# Patient Record
Sex: MALE | Race: WHITE | NOT HISPANIC OR LATINO | Employment: FULL TIME | URBAN - METROPOLITAN AREA
[De-identification: names, ages, dates, MRNs, and addresses within clinical notes are randomized per-mention and may not be internally consistent; named-entity substitution may affect disease eponyms.]

---

## 2017-05-10 ENCOUNTER — TELEPHONE (OUTPATIENT)
Dept: TRANSPLANT | Facility: CLINIC | Age: 62
End: 2017-05-10

## 2017-05-10 NOTE — TELEPHONE ENCOUNTER
----- Message from Vonda Valero sent at 5/9/2017  3:29 PM CDT -----  Contact: Angel Luis  F/abran on pt referral, please call  [phone]

## 2017-05-11 ENCOUNTER — TELEPHONE (OUTPATIENT)
Dept: TRANSPLANT | Facility: CLINIC | Age: 62
End: 2017-05-11

## 2017-05-11 ENCOUNTER — DOCUMENTATION ONLY (OUTPATIENT)
Dept: TRANSPLANT | Facility: CLINIC | Age: 62
End: 2017-05-11

## 2017-05-11 RX ORDER — RAMIPRIL 5 MG/1
CAPSULE ORAL
COMMUNITY
Start: 2017-01-13

## 2017-05-11 NOTE — TELEPHONE ENCOUNTER
Spoke to pt, pending decision if repeat imaging is needed prior to appt her at [facility]. Pt agreed. Informed we are trying to prevent multiple appts on diff.days due to him living in [facility]. HE will obtain CD with noel from [facility]. He will be called with appts within the week pt agreed.

## 2017-05-16 ENCOUNTER — TELEPHONE (OUTPATIENT)
Dept: TRANSPLANT | Facility: CLINIC | Age: 62
End: 2017-05-16

## 2017-05-16 DIAGNOSIS — R93.2 ABNORMAL LIVER DIAGNOSTIC IMAGING: Primary | ICD-10-CM

## 2017-05-16 NOTE — TELEPHONE ENCOUNTER
----- Message from Chelsy Morejon sent at 5/16/2017  9:37 AM CDT -----  Contact: pt  Wants to speak with Arabella to see if the appts they discussed will be scheduled please call him @  # [phone].

## 2017-05-17 DIAGNOSIS — R93.2 ABNORMAL LIVER DIAGNOSTIC IMAGING: Primary | ICD-10-CM

## 2017-05-17 DIAGNOSIS — R77.2 ELEVATED AFP: Primary | ICD-10-CM

## 2017-05-17 DIAGNOSIS — R77.2 ELEVATED AFP: ICD-10-CM

## 2017-05-23 ENCOUNTER — TELEPHONE (OUTPATIENT)
Dept: RADIOLOGY | Facility: HOSPITAL | Age: 62
End: 2017-05-23

## 2017-05-24 ENCOUNTER — HOSPITAL ENCOUNTER (OUTPATIENT)
Dept: RADIOLOGY | Facility: HOSPITAL | Age: 62
Discharge: HOME | End: 2017-05-24
Payer: COMMERCIAL

## 2017-05-24 ENCOUNTER — PROCEDURE VISIT (OUTPATIENT)
Dept: HEPATOLOGY | Facility: CLINIC | Age: 62
End: 2017-05-24
Attending: INTERNAL MEDICINE
Payer: COMMERCIAL

## 2017-05-24 ENCOUNTER — OFFICE VISIT (OUTPATIENT)
Dept: HEPATOLOGY | Facility: CLINIC | Age: 62
End: 2017-05-24
Payer: COMMERCIAL

## 2017-05-24 ENCOUNTER — TELEPHONE (OUTPATIENT)
Dept: HEPATOLOGY | Facility: CLINIC | Age: 62
End: 2017-05-24

## 2017-05-24 VITALS
HEART RATE: 78 BPM | WEIGHT: 163.81 LBS | TEMPERATURE: 98 F | BODY MASS INDEX: 26.33 KG/M2 | DIASTOLIC BLOOD PRESSURE: 99 MMHG | RESPIRATION RATE: 16 BRPM | SYSTOLIC BLOOD PRESSURE: 158 MMHG | OXYGEN SATURATION: 98 % | HEIGHT: 66 IN

## 2017-05-24 DIAGNOSIS — F10.11 ALCOHOL USE DISORDER, MILD, IN EARLY REMISSION: ICD-10-CM

## 2017-05-24 DIAGNOSIS — R93.2 ABNORMAL LIVER DIAGNOSTIC IMAGING: ICD-10-CM

## 2017-05-24 DIAGNOSIS — R77.2 HIGH ALPHA FETOPROTEIN (AFP) TUMOR MARKER: ICD-10-CM

## 2017-05-24 DIAGNOSIS — B18.2 CHRONIC HEPATITIS C WITHOUT HEPATIC COMA: ICD-10-CM

## 2017-05-24 DIAGNOSIS — R77.2 HIGH ALPHA FETOPROTEIN (AFP) TUMOR MARKER: Primary | ICD-10-CM

## 2017-05-24 DIAGNOSIS — R77.2 ELEVATED AFP: ICD-10-CM

## 2017-05-24 PROBLEM — K74.60 CHRONIC HEPATITIS C WITH CIRRHOSIS: Status: ACTIVE | Noted: 2017-05-24

## 2017-05-24 PROCEDURE — 91200 LIVER ELASTOGRAPHY: CPT | Mod: S$GLB,,, | Performed by: INTERNAL MEDICINE

## 2017-05-24 PROCEDURE — 74177 CT ABD & PELVIS W/CONTRAST: CPT | Mod: TC

## 2017-05-24 PROCEDURE — 99999 PR PBB SHADOW E&M-EST. PATIENT-LVL IV: CPT | Mod: PBBFAC,,, | Performed by: INTERNAL MEDICINE

## 2017-05-24 PROCEDURE — 1160F RVW MEDS BY RX/DR IN RCRD: CPT | Mod: S$GLB,,, | Performed by: INTERNAL MEDICINE

## 2017-05-24 PROCEDURE — 99205 OFFICE O/P NEW HI 60 MIN: CPT | Mod: S$GLB,,, | Performed by: INTERNAL MEDICINE

## 2017-05-24 PROCEDURE — 25500020 PHARM REV CODE 255: Performed by: INTERNAL MEDICINE

## 2017-05-24 PROCEDURE — 74177 CT ABD & PELVIS W/CONTRAST: CPT | Mod: 26,,, | Performed by: RADIOLOGY

## 2017-05-24 RX ORDER — VELPATASVIR AND SOFOSBUVIR 100; 400 MG/1; MG/1
400 TABLET, FILM COATED ORAL DAILY
COMMUNITY
Start: 2017-05-22

## 2017-05-24 RX ORDER — ASPIRIN 81 MG/1
81 TABLET ORAL DAILY
COMMUNITY

## 2017-05-24 RX ORDER — KETOROLAC TROMETHAMINE 10 MG/1
10 TABLET, FILM COATED ORAL
COMMUNITY
Start: 2017-01-05 | End: 2017-05-24

## 2017-05-24 RX ORDER — FLUTICASONE PROPIONATE 50 MCG
SPRAY, SUSPENSION (ML) NASAL
COMMUNITY
Start: 2016-04-22

## 2017-05-24 RX ORDER — AMLODIPINE BESYLATE 5 MG/1
5 TABLET ORAL
COMMUNITY
Start: 2017-01-13 | End: 2017-05-24

## 2017-05-24 RX ORDER — TIZANIDINE 4 MG/1
4 TABLET ORAL
COMMUNITY
Start: 2017-01-05 | End: 2017-05-24

## 2017-05-24 RX ADMIN — IOHEXOL 15 ML: 350 INJECTION, SOLUTION INTRAVENOUS at 11:05

## 2017-05-24 RX ADMIN — IOHEXOL 75 ML: 350 INJECTION, SOLUTION INTRAVENOUS at 03:05

## 2017-05-24 RX ADMIN — IOHEXOL 15 ML: 350 INJECTION, SOLUTION INTRAVENOUS at 12:05

## 2017-05-24 NOTE — PROCEDURES
Procedures   Fibroscan Procedure     Name: Hernan Durand  Date of Procedure : 2017   :: Rik Garrett MD  Diagnosis: HCV  Probe: M    Fibroscan readin.6 KPa    IQR/med:20 %    Fibrosis:F4

## 2017-05-24 NOTE — TELEPHONE ENCOUNTER
Patient: Hernan Durand       MRN: [id number]      : 1955     Age: 62 y.o.  [address]  [place] [address]    Provider: Hepatologist - Gerry    Patient Transplant Status: Other new referral    Reason for presentation: Other High AFP    Clinical Summary: HCV cirrhosis. Recently found to have an AFP of 448- down to 240 with HCV treatment.  Scans reported NOT to show a mass  Patient is well    Imaging to be reviewed: CT/MRI- outside + CT 17 (here)    HCC Treatment History: none    ABO:     Platelets:   Lab Results   Component Value Date/Time     (L) 2017 07:21 AM     Creatinine:   Lab Results   Component Value Date/Time    CREATININE 1.2 2017 07:21 AM     Bilirubin:   Lab Results   Component Value Date/Time    BILITOT 1.0 2017 07:21 AM     AFP Last 3 each if available:   Lab Results   Component Value Date/Time     (H) 2017 07:21 AM       MELD: MELD-Na score: 9 at 2017  7:21 AM  MELD score: 9 at 2017  7:21 AM  Calculated from:  Serum Creatinine: 1.2 mg/dL at 2017  7:21 AM  Serum Sodium: 138 mmol/L (Rounded to 137) at 2017  7:21 AM  Total Bilirubin: 1.0 mg/dL at 2017  7:21 AM  INR(ratio): 1.1 at 2017  7:21 AM  Age: 62 years    Plan:     Follow-up Provider:

## 2017-05-24 NOTE — LETTER
May 24, 2017      Aleshia Kwan  [facility] [address] [facility] [place] [address]           [facility]  [address]  [facility] [address]  Phone: [phone]  Fax: [phone]          Patient: Hernan Durand   MR Number: [id number]   YOB: 1955   Date of Visit: 5/24/2017       Dear Aleshia Kwan:    Thank you for referring Hernan Durand to me for evaluation. Attached you will find relevant portions of my assessment and plan of care.    If you have questions, please do not hesitate to call me. I look forward to following Hernan Durand along with you.    Sincerely,    Rik Garrett MD    Enclosure  CC:  No Recipients    If you would like to receive this communication electronically, please contact [email or web address] or [phone] to request more information on [employer] access.    For providers and/or their staff who would like to refer a patient to [facility], please contact us through our one-stop-shop provider referral line, [facility] , at [phone].    If you feel you have received this communication in error or would no longer like to receive these types of communications, please e-mail [email or web address]

## 2017-05-24 NOTE — PROGRESS NOTES
Subjective:       Patient ID: Hernan Durand is a 62 y.o. male.    Chief Complaint: Abnormal Abdominal/Liver Imaging and Elevated AFP    HPI  I saw this 62 y.o. man who came to the hepatology clinic with his wife.  He has known about his HCV G1a infection for a number of years and was previously a partial responder to Peg/Riba (2008).    Over the last year he has had some mild right flank pain but has otherwise been well with well compensated liver disease.    His fibroscan today shows cirrhosis and he is on treatment with epclusa (3 weeks).  - HCV G1a viral load >10 million    He was found to have a high AFP but imaging by US/CT and MRI have not shown a mass.     (previously 448)- Testicular ca screen was normal.    Heavy alcohol until Oct 2016-18-24 beers per week      PMH:  Hypertension  HCV infection    SH:  Works as a [employer]- [employer]    2 kids- boys- healthy    FH:  nil    Review of Systems   Constitutional: Negative for activity change, appetite change, chills, fatigue, fever and unexpected weight change.   HENT: Negative for hearing loss.    Eyes: Negative for discharge and visual disturbance.   Respiratory: Negative for cough, chest tightness, shortness of breath and wheezing.    Cardiovascular: Negative for chest pain, palpitations and leg swelling.   Gastrointestinal: Negative for abdominal distention, abdominal pain, constipation, diarrhea and nausea.   Genitourinary: Negative for dysuria and frequency.   Musculoskeletal: Negative for arthralgias and back pain.   Skin: Negative for pallor and rash.   Neurological: Negative for dizziness, tremors, speech difficulty and headaches.   Hematological: Negative for adenopathy.   Psychiatric/Behavioral: Negative for agitation and confusion.           Lab Results   Component Value Date    ALT 27 05/24/2017    AST 26 05/24/2017    GGT 67 (H) 05/24/2017    ALKPHOS 101 05/24/2017    BILITOT 1.0 05/24/2017     No past medical history on  file.  No past surgical history on file.  Current Outpatient Prescriptions   Medication Sig    aspirin (ECOTRIN) 81 MG EC tablet Take 81 mg by mouth once daily.    EPCLUSA 400-100 mg Tab Take 400 mg by mouth once daily at 6am.     fluticasone (FLONASE) 50 mcg/actuation nasal spray 2 SPRAYS BY NASAL ROUTE DAILY.    multivitamin with minerals tablet Take 1 tablet by mouth once daily.    ramipril (ALTACE) 5 MG capsule TAKE 1 CAPSULES BID FOR BLOOD PRESSURE     No current facility-administered medications for this visit.        Objective:      Physical Exam   Constitutional: He is oriented to person, place, and time. He appears well-nourished.   HENT:   Head: Normocephalic.   Eyes: Pupils are equal, round, and reactive to light.   Neck: No thyromegaly present.   Cardiovascular: Normal rate, regular rhythm and normal heart sounds.    Pulmonary/Chest: Effort normal and breath sounds normal. He has no wheezes.   Abdominal: Soft. He exhibits no distension and no mass. There is no tenderness.   Lymphadenopathy:     He has no cervical adenopathy.   Neurological: He is alert and oriented to person, place, and time.   Skin: Skin is warm. No rash noted. No erythema.   Psychiatric: He has a normal mood and affect. His behavior is normal.       Assessment:       1. Chronic hepatitis C without hepatic coma    2. Alcohol use disorder, mild, in early remission    3. High alpha fetoprotein (AFP) tumor marker        Plan:   I explained to him today that he has cirrhosis and have urged him not to have alcohol again.    His AFP has decreased since the initiation of HCV treatment and it is possible that it was a reflection of severe hepatitis rather than HCC.  We will continue to follow this over the next few weeks.    - local EGD- he will arrange locally  - needs to check if he was immunized against HAV and HBV  - no raw oysters    tCT abdo/pelvis today and IR review of all previous scans including today's.    I will let him know  what these results show and if we do not find any masses, I will see him again in July 2017.

## 2017-05-24 NOTE — PATIENT INSTRUCTIONS
1. Your fibroscan shows cirrhosis  2. Please arrange an EGD (endoscopy) throgh your local doctor  3. Don't eat raw oysters  4. Check with your doctor whether you need immunizations for Hep A or B  5. Do not drink alcohol  5. I will see you again in mid July 2017

## 2017-05-29 ENCOUNTER — CONFERENCE (OUTPATIENT)
Dept: TRANSPLANT | Facility: CLINIC | Age: 62
End: 2017-05-29

## 2017-05-30 ENCOUNTER — TELEPHONE (OUTPATIENT)
Dept: TRANSPLANT | Facility: CLINIC | Age: 62
End: 2017-05-30

## 2017-05-30 DIAGNOSIS — K74.69 COMPENSATED HCV CIRRHOSIS: Primary | ICD-10-CM

## 2017-05-30 DIAGNOSIS — B19.20 COMPENSATED HCV CIRRHOSIS: Primary | ICD-10-CM

## 2017-05-30 NOTE — TELEPHONE ENCOUNTER
Patient: Hernan Durand       MRN: [id number]      : 1955     Age: 62 y.o.  [address]  [place] [address]     Provider: Hepatologist Hunter Garrett     Patient Transplant Status: Other new referral     Reason for presentation: Other High AFP     Clinical Summary: HCV cirrhosis. Recently found to have an AFP of 448- down to 240 with HCV treatment.  Scans reported NOT to show a mass  Patient is well     Imaging to be reviewed: CT/MRI- outside + CT 17 (here)     HCC Treatment History: none     ABO:      Platelets:         Lab Results   Component Value Date/Time      (L) 2017 07:21 AM      Creatinine:         Lab Results   Component Value Date/Time     CREATININE 1.2 2017 07:21 AM      Bilirubin:         Lab Results   Component Value Date/Time     BILITOT 1.0 2017 07:21 AM      AFP Last 3 each if available:         Lab Results   Component Value Date/Time      (H) 2017 07:21 AM         MELD: MELD-Na score: 9 at 2017  7:21 AM  MELD score: 9 at 2017  7:21 AM  Calculated from:  Serum Creatinine: 1.2 mg/dL at 2017  7:21 AM  Serum Sodium: 138 mmol/L (Rounded to 137) at 2017  7:21 AM  Total Bilirubin: 1.0 mg/dL at 2017  7:21 AM  INR(ratio): 1.1 at 2017  7:21 AM  Age: 62 years     Plan: Unremarkable CT triple phase. No focal hepatic abnormality seen. Continued surveillance with 3 month triple phase CT or MRI.       Follow-up Provider: Rik Garrett    Note forwarded to hepatology clinical and non-clinical staff to coordinate follow-up

## 2017-05-30 NOTE — TELEPHONE ENCOUNTER
Spoke with wife and told her that review of his scans did not show any malignancy.  We will repeat the CT here in 3 months and see him back in clinic.

## 2017-06-01 ENCOUNTER — PATIENT MESSAGE (OUTPATIENT)
Dept: HEPATOLOGY | Facility: CLINIC | Age: 62
End: 2017-06-01

## 2017-07-19 ENCOUNTER — OFFICE VISIT (OUTPATIENT)
Dept: HEPATOLOGY | Facility: CLINIC | Age: 62
End: 2017-07-19
Payer: COMMERCIAL

## 2017-07-19 ENCOUNTER — LAB VISIT (OUTPATIENT)
Dept: LAB | Facility: HOSPITAL | Age: 62
End: 2017-07-19
Attending: INTERNAL MEDICINE
Payer: COMMERCIAL

## 2017-07-19 VITALS
TEMPERATURE: 98 F | SYSTOLIC BLOOD PRESSURE: 148 MMHG | RESPIRATION RATE: 16 BRPM | HEIGHT: 66 IN | DIASTOLIC BLOOD PRESSURE: 82 MMHG | WEIGHT: 166.25 LBS | HEART RATE: 81 BPM | OXYGEN SATURATION: 96 % | BODY MASS INDEX: 26.72 KG/M2

## 2017-07-19 DIAGNOSIS — B19.20 COMPENSATED HCV CIRRHOSIS: ICD-10-CM

## 2017-07-19 DIAGNOSIS — K74.69 COMPENSATED HCV CIRRHOSIS: ICD-10-CM

## 2017-07-19 DIAGNOSIS — R77.2 HIGH ALPHA FETOPROTEIN (AFP) TUMOR MARKER: ICD-10-CM

## 2017-07-19 DIAGNOSIS — B18.2 CHRONIC HEPATITIS C WITH CIRRHOSIS: ICD-10-CM

## 2017-07-19 DIAGNOSIS — K74.60 CHRONIC HEPATITIS C WITH CIRRHOSIS: ICD-10-CM

## 2017-07-19 DIAGNOSIS — B19.20 COMPENSATED HCV CIRRHOSIS: Primary | ICD-10-CM

## 2017-07-19 DIAGNOSIS — K74.69 COMPENSATED HCV CIRRHOSIS: Primary | ICD-10-CM

## 2017-07-19 LAB
AFP SERPL-MCNC: 29 NG/ML
ALBUMIN SERPL BCP-MCNC: 3.7 G/DL
ALP SERPL-CCNC: 91 U/L
ALT SERPL W/O P-5'-P-CCNC: 22 U/L
ANION GAP SERPL CALC-SCNC: 11 MMOL/L
AST SERPL-CCNC: 27 U/L
BASOPHILS # BLD AUTO: 0.01 K/UL
BASOPHILS NFR BLD: 0.2 %
BILIRUB SERPL-MCNC: 0.8 MG/DL
BUN SERPL-MCNC: 10 MG/DL
CALCIUM SERPL-MCNC: 9.2 MG/DL
CHLORIDE SERPL-SCNC: 105 MMOL/L
CO2 SERPL-SCNC: 25 MMOL/L
CREAT SERPL-MCNC: 1 MG/DL
DIFFERENTIAL METHOD: ABNORMAL
EOSINOPHIL # BLD AUTO: 0.1 K/UL
EOSINOPHIL NFR BLD: 1.2 %
ERYTHROCYTE [DISTWIDTH] IN BLOOD BY AUTOMATED COUNT: 12 %
EST. GFR  (AFRICAN AMERICAN): >60 ML/MIN/1.73 M^2
EST. GFR  (NON AFRICAN AMERICAN): >60 ML/MIN/1.73 M^2
GLUCOSE SERPL-MCNC: 107 MG/DL
HCT VFR BLD AUTO: 45.1 %
HGB BLD-MCNC: 16.3 G/DL
INR PPP: 1.1
LYMPHOCYTES # BLD AUTO: 1.3 K/UL
LYMPHOCYTES NFR BLD: 25.9 %
MCH RBC QN AUTO: 35.2 PG
MCHC RBC AUTO-ENTMCNC: 36.1 G/DL
MCV RBC AUTO: 97 FL
MONOCYTES # BLD AUTO: 0.6 K/UL
MONOCYTES NFR BLD: 11.3 %
NEUTROPHILS # BLD AUTO: 3.1 K/UL
NEUTROPHILS NFR BLD: 61 %
PLATELET # BLD AUTO: 122 K/UL
PMV BLD AUTO: 9.3 FL
POTASSIUM SERPL-SCNC: 4 MMOL/L
PROT SERPL-MCNC: 7.3 G/DL
PROTHROMBIN TIME: 11.2 SEC
RBC # BLD AUTO: 4.63 M/UL
SODIUM SERPL-SCNC: 141 MMOL/L
WBC # BLD AUTO: 5.05 K/UL

## 2017-07-19 PROCEDURE — 99999 PR PBB SHADOW E&M-EST. PATIENT-LVL III: CPT | Mod: PBBFAC,,, | Performed by: INTERNAL MEDICINE

## 2017-07-19 PROCEDURE — 85610 PROTHROMBIN TIME: CPT

## 2017-07-19 PROCEDURE — 80053 COMPREHEN METABOLIC PANEL: CPT

## 2017-07-19 PROCEDURE — 85025 COMPLETE CBC W/AUTO DIFF WBC: CPT

## 2017-07-19 PROCEDURE — 82105 ALPHA-FETOPROTEIN SERUM: CPT

## 2017-07-19 PROCEDURE — 36415 COLL VENOUS BLD VENIPUNCTURE: CPT

## 2017-07-19 PROCEDURE — 99214 OFFICE O/P EST MOD 30 MIN: CPT | Mod: S$GLB,,, | Performed by: INTERNAL MEDICINE

## 2017-07-19 NOTE — PROGRESS NOTES
Subjective:       Patient ID: Hernan Durand is a 62 y.o. male.    Chief Complaint: Hepatitis C and Cirrhosis    HPI  I saw this 62 y.o. man who came to the hepatology clinic with his wife.  He has known about his HCV G1a infection for a number of years and was previously a partial responder to Peg/Riba (2008).    Over the last year he has had some mild right flank pain but has otherwise been well with well compensated liver disease.    His fibroscan showed cirrhosis and he is on treatment with epclusa (4 dsays of treatment left).  - HCV G1a viral load >10 million    He was found to have a high AFP but imaging by US/CT and MRI have not shown a mass.     (previously 448)- Testicular ca screen was normal.    Heavy alcohol until Oct 2016-18-24 beers per week    IR conference review of imaging on 5/29/2017:  Unremarkable CT triple phase. No focal hepatic abnormality seen. Continued surveillance with 3 month triple phase CT or MRI.    CT abdo: 5/24/2017  Nodular liver contour suggesting cirrhosis with a recanalized umbilical vein and minimal gastroesophageal varices compatible with portal hypertension.  There are no hepatic lesions to suggest hemangiomas or HCC.  Report of the outside ultrasound demonstrating multiple hepatic hemangiomas cannot be confirmed.    Cholelithiasis, largest stone measuring 3mm.    EGD: 7/12/2017  Normal    PMH:  Hypertension  HCV infection    SH:  Works as a [employer]- [place] [employer]    2 kids- boys- healthy    FH:  nil    Review of Systems   Constitutional: Negative for activity change, appetite change, chills, fatigue, fever and unexpected weight change.   HENT: Negative for hearing loss.    Eyes: Negative for discharge and visual disturbance.   Respiratory: Negative for cough, chest tightness, shortness of breath and wheezing.    Cardiovascular: Negative for chest pain, palpitations and leg swelling.   Gastrointestinal: Negative for abdominal distention, abdominal pain,  constipation, diarrhea and nausea.   Genitourinary: Negative for dysuria and frequency.   Musculoskeletal: Negative for arthralgias and back pain.   Skin: Negative for pallor and rash.   Neurological: Negative for dizziness, tremors, speech difficulty and headaches.   Hematological: Negative for adenopathy.   Psychiatric/Behavioral: Negative for agitation and confusion.           Lab Results   Component Value Date    ALT 27 05/24/2017    AST 26 05/24/2017    GGT 67 (H) 05/24/2017    ALKPHOS 101 05/24/2017    BILITOT 1.0 05/24/2017     History reviewed. No pertinent past medical history.  History reviewed. No pertinent surgical history.  Current Outpatient Prescriptions   Medication Sig    EPCLUSA 400-100 mg Tab Take 400 mg by mouth once daily at 6am.     fluticasone (FLONASE) 50 mcg/actuation nasal spray 2 SPRAYS BY NASAL ROUTE DAILY.    ramipril (ALTACE) 5 MG capsule TAKE 1 CAPSULES BID FOR BLOOD PRESSURE    aspirin (ECOTRIN) 81 MG EC tablet Take 81 mg by mouth once daily.    multivitamin with minerals tablet Take 1 tablet by mouth once daily.     No current facility-administered medications for this visit.        Objective:      Physical Exam   Constitutional: He is oriented to person, place, and time. He appears well-nourished.   HENT:   Head: Normocephalic.   Eyes: Pupils are equal, round, and reactive to light.   Neck: No thyromegaly present.   Cardiovascular: Normal rate, regular rhythm and normal heart sounds.    Pulmonary/Chest: Effort normal and breath sounds normal. He has no wheezes.   Abdominal: Soft. He exhibits no distension and no mass. There is no tenderness.   Lymphadenopathy:     He has no cervical adenopathy.   Neurological: He is alert and oriented to person, place, and time.   Skin: Skin is warm. No rash noted. No erythema.   Psychiatric: He has a normal mood and affect. His behavior is normal.       Assessment:       1. Compensated HCV cirrhosis    2. High alpha fetoprotein (AFP) tumor  marker    3. Chronic hepatitis C with cirrhosis        Plan:   I explained to him today that he has cirrhosis and have urged him not to have alcohol again.    His AFP has decreased since the initiation of HCV treatment and it is possible that it was a reflection of severe hepatitis rather than HCC. We will repeat this today.    - finish HAV and HBV immunization  - no raw oysters  - if AFP is decreasing, we will see him in 3 months and get another US at that time. If it's rising, we will get a CT or MRI now.

## 2017-10-18 ENCOUNTER — TELEPHONE (OUTPATIENT)
Dept: HEPATOLOGY | Facility: CLINIC | Age: 62
End: 2017-10-18

## 2017-10-18 NOTE — TELEPHONE ENCOUNTER
Talked to Mr Durand and he has follow up with Dr Morales in [place].  Emphasized the need for ongoing follow ups with imaging.    - recent AFP down to normal and recent US unremarkable.    We will not schedule a routine follow up for him at [facility] at this point.